# Patient Record
Sex: MALE | URBAN - METROPOLITAN AREA
[De-identification: names, ages, dates, MRNs, and addresses within clinical notes are randomized per-mention and may not be internally consistent; named-entity substitution may affect disease eponyms.]

---

## 2024-09-26 ENCOUNTER — ANCILLARY PROCEDURE (OUTPATIENT)
Dept: URGENT CARE | Age: 79
End: 2024-09-26
Payer: COMMERCIAL

## 2024-09-26 ENCOUNTER — OFFICE VISIT (OUTPATIENT)
Dept: URGENT CARE | Age: 79
End: 2024-09-26
Payer: COMMERCIAL

## 2024-09-26 VITALS
WEIGHT: 228 LBS | OXYGEN SATURATION: 97 % | TEMPERATURE: 95.9 F | RESPIRATION RATE: 16 BRPM | DIASTOLIC BLOOD PRESSURE: 78 MMHG | SYSTOLIC BLOOD PRESSURE: 155 MMHG | HEART RATE: 85 BPM

## 2024-09-26 DIAGNOSIS — M79.671 FOOT PAIN, RIGHT: Primary | ICD-10-CM

## 2024-09-26 DIAGNOSIS — M79.671 FOOT PAIN, RIGHT: ICD-10-CM

## 2024-09-26 PROCEDURE — 73630 X-RAY EXAM OF FOOT: CPT | Mod: RIGHT SIDE | Performed by: FAMILY MEDICINE

## 2024-09-26 RX ORDER — GABAPENTIN 300 MG/1
600 CAPSULE ORAL 3 TIMES DAILY
COMMUNITY
Start: 2024-04-25 | End: 2025-01-20

## 2024-09-26 RX ORDER — BENAZEPRIL HYDROCHLORIDE 20 MG/1
20 TABLET ORAL
COMMUNITY
Start: 2024-04-25 | End: 2025-04-20

## 2024-09-26 RX ORDER — ATORVASTATIN CALCIUM 20 MG/1
20 TABLET, FILM COATED ORAL
COMMUNITY
Start: 2024-06-13 | End: 2025-06-13

## 2024-09-26 RX ORDER — SPIRONOLACTONE 25 MG/1
TABLET ORAL
COMMUNITY
Start: 2023-08-22

## 2024-09-26 RX ORDER — PREDNISONE 20 MG/1
TABLET ORAL
Qty: 3 TABLET | Refills: 0 | Status: SHIPPED | OUTPATIENT
Start: 2024-09-26

## 2024-09-26 RX ORDER — GLIMEPIRIDE 4 MG/1
1 TABLET ORAL 2 TIMES DAILY
COMMUNITY
Start: 2023-08-22

## 2024-09-26 RX ORDER — ASPIRIN 81 MG/1
81 TABLET ORAL DAILY
COMMUNITY

## 2024-09-26 RX ORDER — FOSINOPRIL SODIUM 20 MG/1
1 TABLET ORAL DAILY
COMMUNITY

## 2024-09-26 RX ORDER — METFORMIN HYDROCHLORIDE 500 MG/1
1 TABLET ORAL
COMMUNITY
Start: 2024-06-13

## 2024-09-26 NOTE — ADDENDUM NOTE
Addended by: VENUS ZALDIVAR on: 9/26/2024 12:59 PM     Modules accepted: Orders     Information: Selecting Yes will display possible errors in your note based on the variables you have selected. This validation is only offered as a suggestion for you. PLEASE NOTE THAT THE VALIDATION TEXT WILL BE REMOVED WHEN YOU FINALIZE YOUR NOTE. IF YOU WANT TO FAX A PRELIMINARY NOTE YOU WILL NEED TO TOGGLE THIS TO 'NO' IF YOU DO NOT WANT IT IN YOUR FAXED NOTE.

## 2024-09-26 NOTE — PROGRESS NOTES
Subjective   Patient ID: Yovani Goodrich is a 79 y.o. male. They present today with a chief complaint of right foot issue.    History of Present Illness  79-year-old male presents with 3 days of acute onset of right foot pain itching and redness.  No known injury just woke with it 1 day.  Has some heat to it and some pins and needle sensation.  No known injury but did have a fall 2 weeks ago that injured his left knee but he did not have any right foot pain at that time.  Using Tylenol with some relief and Neosporin.  Denies a  history of gout          Past Medical History  Allergies as of 09/26/2024    (No Known Allergies)       (Not in a hospital admission)       No past medical history on file.    No past surgical history on file.     reports that he has never smoked. He has never used smokeless tobacco. He reports that he does not use drugs.    Review of Systems  Review of Systems                               Objective    Vitals:    09/26/24 1203   BP: 155/78   Pulse: 85   Resp: 16   Temp: 35.5 °C (95.9 °F)   SpO2: 97%   Weight: 103 kg (228 lb)     No LMP for male patient.    Physical Exam    General- No apparent distress, well appearing  Cardiovascular- regular rate and rhythm, no gallops, murmurs or rubs  Lungs- clear to auscultation bilaterally, no wheezing or rhonchi  Extremity-lateral aspect of the dorsum of the right foot has some redness but no warmth minimal swelling.  The erythema almost has a vasculitic appearance with a few little petechiae.  No focal tenderness some ecchymoses over the toes.    Procedures    Point of Care Test & Imaging Results from this visit  No results found for this visit on 09/26/24.   No results found.    Diagnostic study results (if any) were reviewed by Foreign Hilliard MD.    Assessment/Plan   Allergies, medications, history, and pertinent labs/EKGs/Imaging reviewed by Foreign Hilliard MD.     Medical Decision Making  Xray neg, trial of Pred for insect/contact derm, follow  up, PCP, ER if worse    Orders and Diagnoses  There are no diagnoses linked to this encounter.    Medical Admin Record      Patient disposition: Home    Electronically signed by Foreign Hilliard MD  12:17 PM